# Patient Record
Sex: MALE | Race: WHITE | Employment: FULL TIME | URBAN - METROPOLITAN AREA
[De-identification: names, ages, dates, MRNs, and addresses within clinical notes are randomized per-mention and may not be internally consistent; named-entity substitution may affect disease eponyms.]

---

## 2024-07-14 ENCOUNTER — APPOINTMENT (OUTPATIENT)
Dept: GENERAL RADIOLOGY | Age: 34
End: 2024-07-14
Attending: PHYSICIAN ASSISTANT
Payer: COMMERCIAL

## 2024-07-14 ENCOUNTER — HOSPITAL ENCOUNTER (OUTPATIENT)
Age: 34
Discharge: HOME | End: 2024-07-14
Payer: COMMERCIAL

## 2024-07-14 VITALS
WEIGHT: 200 LBS | BODY MASS INDEX: 31.39 KG/M2 | DIASTOLIC BLOOD PRESSURE: 93 MMHG | HEART RATE: 73 BPM | OXYGEN SATURATION: 98 % | HEIGHT: 67 IN | TEMPERATURE: 97 F | RESPIRATION RATE: 20 BRPM | SYSTOLIC BLOOD PRESSURE: 153 MMHG

## 2024-07-14 DIAGNOSIS — M76.62 ACHILLES TENDINITIS OF LEFT LOWER EXTREMITY: Primary | ICD-10-CM

## 2024-07-14 PROCEDURE — 99204 OFFICE O/P NEW MOD 45 MIN: CPT

## 2024-07-14 PROCEDURE — 73610 X-RAY EXAM OF ANKLE: CPT | Performed by: PHYSICIAN ASSISTANT

## 2024-07-14 PROCEDURE — 99203 OFFICE O/P NEW LOW 30 MIN: CPT

## 2024-07-14 RX ORDER — NAPROXEN 500 MG/1
500 TABLET ORAL 2 TIMES DAILY PRN
Qty: 14 TABLET | Refills: 0 | Status: SHIPPED | OUTPATIENT
Start: 2024-07-14 | End: 2024-07-21

## 2024-07-14 NOTE — ED INITIAL ASSESSMENT (HPI)
Patient reports ankle pain for almost 2 weeks.  Patient does not recall any type of injury to the left ankle.  Patient reports pain to the back of his heel.

## 2024-07-14 NOTE — ED PROVIDER NOTES
Patient Seen in: [facility]      History     Chief Complaint   Patient presents with    Ankle Pain     Stated Complaint: ankle pain    Subjective:   HPI    Patient states for the last approximately 10 days he has been having some pain to the left posterior ankle.  Denies any known injury/trauma to the ankle.  States that the pain has been more gradual.  At rest he has almost no pain, it is worse with weightbearing.  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  Denies weakness/paresthesias.  Denies recent antibiotic usage.  Denies any other complaints or concerns at this time.    Objective:   History reviewed. No pertinent past medical history.           History reviewed. No pertinent surgical history.             Social History     Socioeconomic History    Marital status:    Tobacco Use    Smoking status: Never    Smokeless tobacco: Current              Review of Systems    Positive for stated Chief Complaint: Ankle Pain    Other systems are as noted in HPI.  Constitutional and vital signs reviewed.      All other systems reviewed and negative except as noted above.    Physical Exam     ED Triage Vitals [07/14/24 1102]   BP (!) 153/93   Pulse 73   Resp 20   Temp 97.4 °F (36.3 °C)   Temp src Temporal   SpO2 98 %   O2 Device None (Room air)       Current Vitals:   Vital Signs  BP: (!) 153/93  Pulse: 73  Resp: 20  Temp: 97.4 °F (36.3 °C)  Temp src: Temporal    Oxygen Therapy  SpO2: 98 %  O2 Device: None (Room air)            Physical Exam  Vitals and nursing note reviewed.   Constitutional:       Appearance: Normal appearance.   Eyes:      Conjunctiva/sclera: Conjunctivae normal.   Pulmonary:      Effort: Pulmonary effort is normal.   Musculoskeletal:         General: Normal range of motion.      Left ankle: No swelling, deformity, ecchymosis or lacerations. Normal range of motion.      Left Achilles Tendon:  Tenderness present. No defects. Duke's test negative.   Skin:     General: Skin is warm and dry.   Neurological:      General: No focal deficit present.      Mental Status: He is alert.   Psychiatric:         Mood and Affect: Mood normal.               ED Course   Labs Reviewed - No data to display          XR ANKLE (MIN 3 VIEWS), LEFT (CPT=73610)    Result Date: 7/14/2024            PROCEDURE:  XR ANKLE (MIN 3 VIEWS), LEFT (CPT=73610)  TECHNIQUE:  Three views were obtained.  COMPARISON:  None.  INDICATIONS:  ankle pain  PATIENT STATED HISTORY: (As transcribed by Technologist)  Patient states that he has left ankle pain and does not recall any injury. Patient has difficulty putting pressure on his left ankle and foot and the pain worsens when he flexes or extends his left ankle.    Findings:  No fracture or dislocation.  Joint spaces are well maintained.  No significant bony abnormality.  IMPRESSION:  Unremarkable radiographs of the left ankle.   LOCATION:  [place] [id number]   Dictated by (CST): Nolan Ruiz MD on 7/14/2024 at 11:39 AM     Finalized by (CST): Nolan Ruiz MD on 7/14/2024 at 11:39 AM        I have reviewed x-ray images personally and see no evidence of acute fracture.         MDM      Differential diagnosis includes but is not limited to strain/sprain, tendon rupture, fracture, dislocation.    Patient well-appearing, nontoxic.  Discussed differential diagnosis with patient and wife.  No acute findings on x-ray.  Discussed given his history, consider Achilles tendinitis.  There is no evidence of rupture at this time.  There is no calf swelling or tenderness, discussed low suspicion for DVT do not recommend ultrasound at this time.  Discussed plan for NSAIDs, ice, rest and other supportive care at home.  Podiatry referral provided.  Work restriction note provided.  I advised follow-up and provided specific return precautions.  Patient and wife verbalized understanding agreement plan.    This  report has been produced using speech recognition software and may contain errors related to that system including, but not limited to, errors in grammar, punctuation, and spelling, as well as words and phrases that possibly may have been recognized inappropriately.  If there are any questions or concerns, contact the dictating provider for clarification.     NOTE: The 21st Century C[employer] Act makes medical notes available to patients.  Be advised that this is a medical document written in medical language and may contain abbreviations or verbiage that is unfamiliar or direct.  It is primarily intended to carry relevant historical information, physical exam findings, and the clinical assessment of the physician.                                     Medical Decision Making  Risk  Prescription drug management.        Disposition and Plan     Clinical Impression:  1. Achilles tendinitis of left lower extremity         Disposition:  Discharge  7/14/2024 11:50 am    Follow-up:  Francisco Alcantara, DESTINY  [address] [address]  [phone]    Call in 1 day      PCP    In 3 days            Medications Prescribed:  Discharge Medication List as of 7/14/2024 11:51 AM        START taking these medications    Details   naproxen 500 MG Oral Tab Take 1 tablet (500 mg total) by mouth 2 (two) times daily as needed., Normal, Disp-14 tablet, R-0

## 2025-05-31 ENCOUNTER — HOSPITAL ENCOUNTER (EMERGENCY)
Age: 35
Discharge: HOME | End: 2025-05-31
Payer: COMMERCIAL

## 2025-05-31 VITALS
WEIGHT: 190 LBS | SYSTOLIC BLOOD PRESSURE: 152 MMHG | TEMPERATURE: 98 F | HEIGHT: 67 IN | RESPIRATION RATE: 16 BRPM | BODY MASS INDEX: 29.82 KG/M2 | HEART RATE: 97 BPM | DIASTOLIC BLOOD PRESSURE: 102 MMHG | OXYGEN SATURATION: 97 %

## 2025-05-31 DIAGNOSIS — H11.422 CHEMOSIS OF LEFT CONJUNCTIVA: Primary | ICD-10-CM

## 2025-05-31 PROCEDURE — 99283 EMERGENCY DEPT VISIT LOW MDM: CPT

## 2025-05-31 PROCEDURE — 99284 EMERGENCY DEPT VISIT MOD MDM: CPT

## 2025-05-31 RX ORDER — DEXAMETHASONE 4 MG/1
8 TABLET ORAL ONCE
Status: COMPLETED | OUTPATIENT
Start: 2025-05-31 | End: 2025-05-31

## 2025-05-31 RX ORDER — OLOPATADINE HYDROCHLORIDE 2 MG/ML
1 SOLUTION OPHTHALMIC DAILY
Qty: 2.5 ML | Refills: 0 | Status: SHIPPED | OUTPATIENT
Start: 2025-05-31

## 2025-05-31 NOTE — DISCHARGE INSTRUCTIONS
Cool compresses.  Continue Benadryl.  Pataday as written.  Avoid contacts until completely resolved

## 2025-05-31 NOTE — ED PROVIDER NOTES
Patient Seen in: [facility]        History  Chief Complaint   Patient presents with    Eye Visual Problem     Stated Complaint: right eye swelling    Subjective:   HPI            34-year-old male.  Patient explains that they were at a pet store earlier today.  He was playing with parents.  He has a known allergy to other animal dander.  Shortly after the visit to the store he developed irritation to his right eye.  He had taken a nap and he awoke with visible swelling to the conjunctival surface.  Described a generalized irritation and itching to the eye.  Clear drainage.  He immediately took out his contact and took 50 mg of Benadryl.  Since taking the Benadryl, the swelling is improving.      Objective:     History reviewed. No pertinent past medical history.           Past Surgical History:   Procedure Laterality Date    Appendectomy                  Social History     Socioeconomic History    Marital status:    Tobacco Use    Smoking status: Never    Smokeless tobacco: Current   Vaping Use    Vaping status: Never Used   Substance and Sexual Activity    Alcohol use: Never    Drug use: Never                                Physical Exam    ED Triage Vitals [05/31/25 1406]   BP (!) 152/102   Pulse 97   Resp 16   Temp 98.1 °F (36.7 °C)   Temp src Temporal   SpO2 97 %   O2 Device None (Room air)       Current Vitals:   Vital Signs  BP: (!) 152/102  Pulse: 97  Resp: 16  Temp: 98.1 °F (36.7 °C)  Temp src: Temporal    Oxygen Therapy  SpO2: 97 %  O2 Device: None (Room air)            Physical Exam       Gen: Well appearing, well groomed, alert and aware x 3  Lung: No distress, RR, no retraction  Extremities: Full ROM, no deformity, NVI  On exam: Mild chemosis.  Minimal injection.  Increased tearing.  EOMs are intact.  PERRLA bilaterally.  Fluorescein staining demonstrates no uptake.  Neuro:  Normal Gait    ED Course  Labs Reviewed - No data to display                         MDM      Mild chemosis noted on exam.  Tearing noted.      Tetracaine applied.  Fluorescein staining performed, no uptake    I then had nursing staff irrigate the eye    Patient received 8 milligrams of Decadron.  Continue Benadryl.  Cool compresses.    Initiate Pataday    Avoid contacts until completely resolved.    Medical Decision Making      Disposition and Plan     Clinical Impression:  1. Chemosis of left conjunctiva         Disposition:  There is no disposition on file for this visit.  There is no disposition time on file for this visit.    Follow-up:  No follow-up provider specified.        Medications Prescribed:  There are no discharge medications for this patient.            Supplementary Documentation:

## 2025-05-31 NOTE — ED INITIAL ASSESSMENT (HPI)
Pt to ED with right eye redness, irritation, itching and drainage that started today. Pt took off his contact and took benadryl 50mg 30mins PTA.

## (undated) NOTE — LETTER
Date & Time: 7/14/2024, 11:49 AM  Patient: Fan Weir  Encounter Provider(s):    Josephine Mora PA       To Whom It May Concern:    Fan Weir was seen and treated in our department on 7/14/2024. He can return to work with these limitations: limited walking, sit/rest as needed .    If you have any questions or concerns, please do not hesitate to call.        _____________________________  Physician/APC Signature